# Patient Record
Sex: FEMALE | Race: WHITE | NOT HISPANIC OR LATINO | URBAN - METROPOLITAN AREA
[De-identification: names, ages, dates, MRNs, and addresses within clinical notes are randomized per-mention and may not be internally consistent; named-entity substitution may affect disease eponyms.]

---

## 2017-01-10 ENCOUNTER — COMMUNICATION - HEALTHEAST (OUTPATIENT)
Dept: SCHEDULING | Facility: CLINIC | Age: 57
End: 2017-01-10

## 2017-01-17 ENCOUNTER — COMMUNICATION - HEALTHEAST (OUTPATIENT)
Dept: FAMILY MEDICINE | Facility: CLINIC | Age: 57
End: 2017-01-17

## 2017-01-24 ENCOUNTER — COMMUNICATION - HEALTHEAST (OUTPATIENT)
Dept: FAMILY MEDICINE | Facility: CLINIC | Age: 57
End: 2017-01-24

## 2017-02-07 ENCOUNTER — AMBULATORY - HEALTHEAST (OUTPATIENT)
Dept: PHYSICAL MEDICINE AND REHAB | Facility: CLINIC | Age: 57
End: 2017-02-07

## 2017-02-07 ENCOUNTER — COMMUNICATION - HEALTHEAST (OUTPATIENT)
Dept: PHYSICAL MEDICINE AND REHAB | Facility: CLINIC | Age: 57
End: 2017-02-07

## 2017-02-07 DIAGNOSIS — M54.16 RIGHT LUMBAR RADICULITIS: ICD-10-CM

## 2017-02-07 DIAGNOSIS — M48.061 LUMBAR SPINAL STENOSIS: ICD-10-CM

## 2017-02-07 DIAGNOSIS — M51.16 LUMBAR DISC HERNIATION WITH RADICULOPATHY: ICD-10-CM

## 2017-02-09 ENCOUNTER — AMBULATORY - HEALTHEAST (OUTPATIENT)
Dept: NEUROSURGERY | Facility: CLINIC | Age: 57
End: 2017-02-09

## 2017-02-09 DIAGNOSIS — M54.9 BACK PAIN: ICD-10-CM

## 2017-02-13 ENCOUNTER — COMMUNICATION - HEALTHEAST (OUTPATIENT)
Dept: FAMILY MEDICINE | Facility: CLINIC | Age: 57
End: 2017-02-13

## 2017-02-15 ENCOUNTER — AMBULATORY - HEALTHEAST (OUTPATIENT)
Dept: NEUROSURGERY | Facility: CLINIC | Age: 57
End: 2017-02-15

## 2017-02-15 ENCOUNTER — COMMUNICATION - HEALTHEAST (OUTPATIENT)
Dept: SURGERY | Facility: CLINIC | Age: 57
End: 2017-02-15

## 2017-02-15 ENCOUNTER — OFFICE VISIT - HEALTHEAST (OUTPATIENT)
Dept: NEUROSURGERY | Facility: CLINIC | Age: 57
End: 2017-02-15

## 2017-02-15 ENCOUNTER — HOSPITAL ENCOUNTER (OUTPATIENT)
Dept: RADIOLOGY | Facility: HOSPITAL | Age: 57
Discharge: HOME | End: 2017-02-15
Attending: NEUROLOGICAL SURGERY

## 2017-02-15 DIAGNOSIS — E66.01 MORBID OBESITY (H): ICD-10-CM

## 2017-02-15 DIAGNOSIS — M54.41 CHRONIC BILATERAL LOW BACK PAIN WITH RIGHT-SIDED SCIATICA: ICD-10-CM

## 2017-02-15 DIAGNOSIS — G89.29 CHRONIC BILATERAL LOW BACK PAIN WITH RIGHT-SIDED SCIATICA: ICD-10-CM

## 2017-02-15 DIAGNOSIS — F32.A DEPRESSION: ICD-10-CM

## 2017-02-15 DIAGNOSIS — M48.062 LUMBAR STENOSIS WITH NEUROGENIC CLAUDICATION: ICD-10-CM

## 2017-02-15 DIAGNOSIS — M54.9 BACK PAIN: ICD-10-CM

## 2017-02-15 DIAGNOSIS — M51.26 DISPLACEMENT OF LUMBAR INTERVERTEBRAL DISC WITHOUT MYELOPATHY: ICD-10-CM

## 2017-02-15 DIAGNOSIS — F17.200 TOBACCO USE DISORDER: ICD-10-CM

## 2017-02-15 ASSESSMENT — MIFFLIN-ST. JEOR: SCORE: 1618.22

## 2017-02-17 ENCOUNTER — APPOINTMENT (OUTPATIENT)
Dept: CT IMAGING | Facility: CLINIC | Age: 57
End: 2017-02-17
Attending: STUDENT IN AN ORGANIZED HEALTH CARE EDUCATION/TRAINING PROGRAM
Payer: COMMERCIAL

## 2017-02-17 ENCOUNTER — RECORDS - HEALTHEAST (OUTPATIENT)
Dept: ADMINISTRATIVE | Facility: OTHER | Age: 57
End: 2017-02-17

## 2017-02-17 ENCOUNTER — OFFICE VISIT - HEALTHEAST (OUTPATIENT)
Dept: FAMILY MEDICINE | Facility: CLINIC | Age: 57
End: 2017-02-17

## 2017-02-17 ENCOUNTER — HOSPITAL ENCOUNTER (EMERGENCY)
Facility: CLINIC | Age: 57
Discharge: HOME | End: 2017-02-17
Attending: STUDENT IN AN ORGANIZED HEALTH CARE EDUCATION/TRAINING PROGRAM | Admitting: STUDENT IN AN ORGANIZED HEALTH CARE EDUCATION/TRAINING PROGRAM
Payer: COMMERCIAL

## 2017-02-17 VITALS — DIASTOLIC BLOOD PRESSURE: 77 MMHG | OXYGEN SATURATION: 95 % | TEMPERATURE: 98.2 F | SYSTOLIC BLOOD PRESSURE: 170 MMHG

## 2017-02-17 DIAGNOSIS — E55.9 VITAMIN D DEFICIENCY: ICD-10-CM

## 2017-02-17 DIAGNOSIS — F17.200 TOBACCO USE DISORDER: ICD-10-CM

## 2017-02-17 DIAGNOSIS — M17.12 LEFT KNEE DJD: ICD-10-CM

## 2017-02-17 DIAGNOSIS — M54.41 CHRONIC BILATERAL LOW BACK PAIN WITH RIGHT-SIDED SCIATICA: ICD-10-CM

## 2017-02-17 DIAGNOSIS — G89.29 CHRONIC BILATERAL LOW BACK PAIN WITH RIGHT-SIDED SCIATICA: ICD-10-CM

## 2017-02-17 DIAGNOSIS — S16.1XXA STRAIN OF NECK MUSCLE, INITIAL ENCOUNTER: ICD-10-CM

## 2017-02-17 DIAGNOSIS — E78.5 HYPERLIPIDEMIA: ICD-10-CM

## 2017-02-17 LAB
CHOLEST SERPL-MCNC: 249 MG/DL
FASTING STATUS PATIENT QL REPORTED: YES
HDLC SERPL-MCNC: 41 MG/DL
LDLC SERPL CALC-MCNC: 169 MG/DL
RADIOLOGIST FLAGS: ABNORMAL
TRIGL SERPL-MCNC: 193 MG/DL

## 2017-02-17 PROCEDURE — 25500064 ZZH RX 255 OP 636: Performed by: STUDENT IN AN ORGANIZED HEALTH CARE EDUCATION/TRAINING PROGRAM

## 2017-02-17 PROCEDURE — 25000128 H RX IP 250 OP 636: Performed by: STUDENT IN AN ORGANIZED HEALTH CARE EDUCATION/TRAINING PROGRAM

## 2017-02-17 PROCEDURE — 70498 CT ANGIOGRAPHY NECK: CPT

## 2017-02-17 PROCEDURE — 99284 EMERGENCY DEPT VISIT MOD MDM: CPT | Mod: 25

## 2017-02-17 PROCEDURE — 96360 HYDRATION IV INFUSION INIT: CPT

## 2017-02-17 PROCEDURE — 25000125 ZZHC RX 250: Performed by: STUDENT IN AN ORGANIZED HEALTH CARE EDUCATION/TRAINING PROGRAM

## 2017-02-17 PROCEDURE — 99284 EMERGENCY DEPT VISIT MOD MDM: CPT | Performed by: STUDENT IN AN ORGANIZED HEALTH CARE EDUCATION/TRAINING PROGRAM

## 2017-02-17 PROCEDURE — 72125 CT NECK SPINE W/O DYE: CPT

## 2017-02-17 RX ORDER — IOPAMIDOL 755 MG/ML
70 INJECTION, SOLUTION INTRAVASCULAR ONCE
Status: COMPLETED | OUTPATIENT
Start: 2017-02-17 | End: 2017-02-17

## 2017-02-17 RX ORDER — OXYCODONE AND ACETAMINOPHEN 5; 325 MG/1; MG/1
1 TABLET ORAL EVERY 8 HOURS PRN
COMMUNITY

## 2017-02-17 RX ORDER — CLOBETASOL PROPIONATE 0.5 MG/G
OINTMENT TOPICAL 2 TIMES DAILY
COMMUNITY

## 2017-02-17 RX ADMIN — IOPAMIDOL 70 ML: 755 INJECTION, SOLUTION INTRAVENOUS at 15:32

## 2017-02-17 RX ADMIN — SODIUM CHLORIDE 1000 ML: 9 INJECTION, SOLUTION INTRAVENOUS at 15:50

## 2017-02-17 RX ADMIN — SODIUM CHLORIDE 100 ML: 9 INJECTION, SOLUTION INTRAVENOUS at 15:32

## 2017-02-17 ASSESSMENT — MIFFLIN-ST. JEOR: SCORE: 1638.63
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
## 2017-02-17 NOTE — ED AVS SNAPSHOT
[facility] Emergency Department    [address] [address]    Phone:  [phone]    Fax:  [phone]                                       Celia Love   MRN: [id number]    Department:  [facility] Emergency Department   Date of Visit:  2/17/2017           After Visit Summary Signature Page     I have received my discharge instructions, and my questions have been answered. I have discussed any challenges I see with this plan with the nurse or doctor.    ..........................................................................................................................................  Patient/Patient Representative Signature      ..........................................................................................................................................  Patient Representative Print Name and Relationship to Patient    ..................................................               ................................................  Date                                            Time    ..........................................................................................................................................  Reviewed by Signature/Title    ...................................................              ..............................................  Date                                                            Time
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
## 2017-02-20 ENCOUNTER — COMMUNICATION - HEALTHEAST (OUTPATIENT)
Dept: FAMILY MEDICINE | Facility: CLINIC | Age: 57
End: 2017-02-20

## 2017-02-21 ENCOUNTER — OFFICE VISIT - HEALTHEAST (OUTPATIENT)
Dept: FAMILY MEDICINE | Facility: CLINIC | Age: 57
End: 2017-02-21

## 2017-02-21 DIAGNOSIS — M54.2 NECK PAIN: ICD-10-CM

## 2017-02-21 DIAGNOSIS — M54.50 LOW BACK PAIN: ICD-10-CM
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
## 2017-02-21 ASSESSMENT — MIFFLIN-ST. JEOR: SCORE: 1638.63

## 2017-02-22 ENCOUNTER — COMMUNICATION - HEALTHEAST (OUTPATIENT)
Dept: FAMILY MEDICINE | Facility: CLINIC | Age: 57
End: 2017-02-22

## 2017-02-22 DIAGNOSIS — K11.9 LESION OF PAROTID GLAND: ICD-10-CM

## 2017-03-02 ENCOUNTER — COMMUNICATION - HEALTHEAST (OUTPATIENT)
Dept: FAMILY MEDICINE | Facility: CLINIC | Age: 57
End: 2017-03-02

## 2017-03-10 ENCOUNTER — COMMUNICATION - HEALTHEAST (OUTPATIENT)
Dept: FAMILY MEDICINE | Facility: CLINIC | Age: 57
End: 2017-03-10

## 2017-03-16 ENCOUNTER — COMMUNICATION - HEALTHEAST (OUTPATIENT)
Dept: FAMILY MEDICINE | Facility: CLINIC | Age: 57
End: 2017-03-16

## 2017-03-16 DIAGNOSIS — M54.50 LOW BACK PAIN: ICD-10-CM

## 2017-03-21 ENCOUNTER — COMMUNICATION - HEALTHEAST (OUTPATIENT)
Dept: SCHEDULING | Facility: CLINIC | Age: 57
End: 2017-03-21

## 2017-04-12 ENCOUNTER — OFFICE VISIT - HEALTHEAST (OUTPATIENT)
Dept: SURGERY | Facility: CLINIC | Age: 57
End: 2017-04-12

## 2017-04-12 ENCOUNTER — OFFICE VISIT - HEALTHEAST (OUTPATIENT)
Dept: OTOLARYNGOLOGY | Facility: CLINIC | Age: 57
End: 2017-04-12

## 2017-04-12 DIAGNOSIS — E55.9 VITAMIN D DEFICIENCY: ICD-10-CM

## 2017-04-12 DIAGNOSIS — E66.01 MORBID OBESITY, UNSPECIFIED OBESITY TYPE (H): ICD-10-CM

## 2017-04-12 DIAGNOSIS — M54.50 LOW BACK PAIN: ICD-10-CM

## 2017-04-12 DIAGNOSIS — K11.8 MASS OF PAROTID GLAND: ICD-10-CM

## 2017-04-12 DIAGNOSIS — E88.819 INSULIN RESISTANCE: ICD-10-CM

## 2017-04-12 ASSESSMENT — MIFFLIN-ST. JEOR
SCORE: 1615.05
SCORE: 1612.55

## 2017-04-13 ENCOUNTER — AMBULATORY - HEALTHEAST (OUTPATIENT)
Dept: LAB | Facility: HOSPITAL | Age: 57
End: 2017-04-13

## 2017-04-13 ENCOUNTER — HOSPITAL ENCOUNTER (OUTPATIENT)
Dept: ULTRASOUND IMAGING | Facility: HOSPITAL | Age: 57
Discharge: HOME | End: 2017-04-13
Attending: OTOLARYNGOLOGY

## 2017-04-13 ENCOUNTER — COMMUNICATION - HEALTHEAST (OUTPATIENT)
Dept: FAMILY MEDICINE | Facility: CLINIC | Age: 57
End: 2017-04-13

## 2017-04-13 DIAGNOSIS — K11.8 MASS OF PAROTID GLAND: ICD-10-CM

## 2017-04-13 DIAGNOSIS — E66.01 MORBID OBESITY, UNSPECIFIED OBESITY TYPE (H): ICD-10-CM

## 2017-04-13 DIAGNOSIS — R22.0 LOCALIZED SWELLING, MASS AND LUMP, HEAD: ICD-10-CM

## 2017-04-14 LAB
HBA1C MFR BLD: 6.9 % (ref 4.2–6.1)
LAB AP CHARGES (HE HISTORICAL CONVERSION): ABNORMAL
LAB AP INITIAL CYTO EVAL (HE HISTORICAL CONVERSION): ABNORMAL
LAB MED GENERAL PATH INTERP (HE HISTORICAL CONVERSION): ABNORMAL
PATH REPORT.COMMENTS IMP SPEC: ABNORMAL
PATH REPORT.FINAL DX SPEC: ABNORMAL
PATH REPORT.MICROSCOPIC SPEC OTHER STN: ABNORMAL
PATH REPORT.RELEVANT HX SPEC: ABNORMAL
SPECIMEN DESCRIPTION: ABNORMAL

## 2017-04-17 ENCOUNTER — COMMUNICATION - HEALTHEAST (OUTPATIENT)
Dept: SCHEDULING | Facility: CLINIC | Age: 57
End: 2017-04-17

## 2017-04-18 ENCOUNTER — COMMUNICATION - HEALTHEAST (OUTPATIENT)
Dept: SURGERY | Facility: CLINIC | Age: 57
End: 2017-04-18

## 2017-04-19 ENCOUNTER — COMMUNICATION - HEALTHEAST (OUTPATIENT)
Dept: ADMINISTRATIVE | Facility: CLINIC | Age: 57
End: 2017-04-19

## 2017-04-28 ENCOUNTER — OFFICE VISIT - HEALTHEAST (OUTPATIENT)
Dept: SURGERY | Facility: CLINIC | Age: 57
End: 2017-04-28

## 2017-04-28 ENCOUNTER — OFFICE VISIT - HEALTHEAST (OUTPATIENT)
Dept: FAMILY MEDICINE | Facility: CLINIC | Age: 57
End: 2017-04-28

## 2017-04-28 DIAGNOSIS — E66.9 OBESITY (BMI 30-39.9): ICD-10-CM

## 2017-04-28 DIAGNOSIS — Z71.3 DIETARY COUNSELING: ICD-10-CM

## 2017-04-28 DIAGNOSIS — M48.062 LUMBAR STENOSIS WITH NEUROGENIC CLAUDICATION: ICD-10-CM

## 2017-04-28 DIAGNOSIS — R07.9 CHEST PAIN: ICD-10-CM

## 2017-04-28 DIAGNOSIS — F41.9 ANXIETY: ICD-10-CM

## 2017-04-28 ASSESSMENT — MIFFLIN-ST. JEOR
SCORE: 1562.65
SCORE: 1566.28

## 2017-05-01 ENCOUNTER — COMMUNICATION - HEALTHEAST (OUTPATIENT)
Dept: FAMILY MEDICINE | Facility: CLINIC | Age: 57
End: 2017-05-01

## 2017-05-04 ENCOUNTER — COMMUNICATION - HEALTHEAST (OUTPATIENT)
Dept: FAMILY MEDICINE | Facility: CLINIC | Age: 57
End: 2017-05-04

## 2017-05-16 ENCOUNTER — COMMUNICATION - HEALTHEAST (OUTPATIENT)
Dept: FAMILY MEDICINE | Facility: CLINIC | Age: 57
End: 2017-05-16

## 2017-06-05 ENCOUNTER — COMMUNICATION - HEALTHEAST (OUTPATIENT)
Dept: FAMILY MEDICINE | Facility: CLINIC | Age: 57
End: 2017-06-05

## 2017-06-09 ENCOUNTER — COMMUNICATION - HEALTHEAST (OUTPATIENT)
Dept: FAMILY MEDICINE | Facility: CLINIC | Age: 57
End: 2017-06-09

## 2017-06-27 ENCOUNTER — RECORDS - HEALTHEAST (OUTPATIENT)
Dept: ADMINISTRATIVE | Facility: OTHER | Age: 57
End: 2017-06-27

## 2017-07-07 ENCOUNTER — COMMUNICATION - HEALTHEAST (OUTPATIENT)
Dept: FAMILY MEDICINE | Facility: CLINIC | Age: 57
End: 2017-07-07

## 2017-07-07 DIAGNOSIS — M54.50 LOW BACK PAIN: ICD-10-CM

## 2017-07-25 ENCOUNTER — OFFICE VISIT - HEALTHEAST (OUTPATIENT)
Dept: FAMILY MEDICINE | Facility: CLINIC | Age: 57
End: 2017-07-25

## 2017-07-25 DIAGNOSIS — M54.2 NECK PAIN: ICD-10-CM

## 2017-07-25 DIAGNOSIS — Z12.31 SCREENING MAMMOGRAM FOR HIGH-RISK PATIENT: ICD-10-CM

## 2017-07-25 DIAGNOSIS — R51.9 HEADACHE: ICD-10-CM

## 2017-07-25 DIAGNOSIS — M54.41 CHRONIC BILATERAL LOW BACK PAIN WITH RIGHT-SIDED SCIATICA: ICD-10-CM

## 2017-07-25 DIAGNOSIS — M25.561 RIGHT KNEE PAIN: ICD-10-CM

## 2017-07-25 DIAGNOSIS — Z12.39 BREAST CANCER SCREENING: ICD-10-CM

## 2017-07-25 DIAGNOSIS — G89.29 CHRONIC BILATERAL LOW BACK PAIN WITH RIGHT-SIDED SCIATICA: ICD-10-CM

## 2017-07-25 DIAGNOSIS — M25.562 LEFT KNEE PAIN: ICD-10-CM

## 2017-07-25 ASSESSMENT — MIFFLIN-ST. JEOR: SCORE: 1593.5

## 2017-07-26 ENCOUNTER — OFFICE VISIT - HEALTHEAST (OUTPATIENT)
Dept: SURGERY | Facility: CLINIC | Age: 57
End: 2017-07-26

## 2017-07-26 DIAGNOSIS — E88.819 INSULIN RESISTANCE: ICD-10-CM

## 2017-07-26 DIAGNOSIS — E11.9 DIABETES (H): ICD-10-CM

## 2017-07-26 DIAGNOSIS — E66.01 MORBID OBESITY, UNSPECIFIED OBESITY TYPE (H): ICD-10-CM

## 2017-07-26 ASSESSMENT — MIFFLIN-ST. JEOR: SCORE: 1589.87

## 2017-08-10 ENCOUNTER — COMMUNICATION - HEALTHEAST (OUTPATIENT)
Dept: FAMILY MEDICINE | Facility: CLINIC | Age: 57
End: 2017-08-10

## 2017-08-15 ENCOUNTER — RECORDS - HEALTHEAST (OUTPATIENT)
Dept: ADMINISTRATIVE | Facility: OTHER | Age: 57
End: 2017-08-15

## 2017-09-07 ENCOUNTER — RECORDS - HEALTHEAST (OUTPATIENT)
Dept: ADMINISTRATIVE | Facility: OTHER | Age: 57
End: 2017-09-07

## 2017-09-07 ENCOUNTER — COMMUNICATION - HEALTHEAST (OUTPATIENT)
Dept: FAMILY MEDICINE | Facility: CLINIC | Age: 57
End: 2017-09-07

## 2017-10-03 ENCOUNTER — COMMUNICATION - HEALTHEAST (OUTPATIENT)
Dept: FAMILY MEDICINE | Facility: CLINIC | Age: 57
End: 2017-10-03

## 2017-10-03 ENCOUNTER — RECORDS - HEALTHEAST (OUTPATIENT)
Dept: ADMINISTRATIVE | Facility: OTHER | Age: 57
End: 2017-10-03

## 2017-10-09 ENCOUNTER — COMMUNICATION - HEALTHEAST (OUTPATIENT)
Dept: FAMILY MEDICINE | Facility: CLINIC | Age: 57
End: 2017-10-09

## 2017-10-09 ENCOUNTER — OFFICE VISIT - HEALTHEAST (OUTPATIENT)
Dept: FAMILY MEDICINE | Facility: CLINIC | Age: 57
End: 2017-10-09

## 2017-10-09 DIAGNOSIS — E11.9 TYPE 2 DIABETES MELLITUS (H): ICD-10-CM

## 2017-10-09 DIAGNOSIS — M17.12 OSTEOARTHRITIS OF LEFT KNEE: ICD-10-CM

## 2017-10-09 DIAGNOSIS — I25.10 CORONARY ARTERY DISEASE: ICD-10-CM

## 2017-10-09 DIAGNOSIS — E78.5 HYPERLIPIDEMIA: ICD-10-CM

## 2017-10-09 DIAGNOSIS — M54.41 CHRONIC BILATERAL LOW BACK PAIN WITH RIGHT-SIDED SCIATICA: ICD-10-CM

## 2017-10-09 DIAGNOSIS — G89.29 CHRONIC BILATERAL LOW BACK PAIN WITH RIGHT-SIDED SCIATICA: ICD-10-CM

## 2017-10-09 DIAGNOSIS — F17.200 TOBACCO USE DISORDER: ICD-10-CM

## 2017-10-11 ENCOUNTER — RECORDS - HEALTHEAST (OUTPATIENT)
Dept: ADMINISTRATIVE | Facility: OTHER | Age: 57
End: 2017-10-11

## 2017-10-30 ENCOUNTER — COMMUNICATION - HEALTHEAST (OUTPATIENT)
Dept: FAMILY MEDICINE | Facility: CLINIC | Age: 57
End: 2017-10-30

## 2018-03-30 ENCOUNTER — RECORDS - HEALTHEAST (OUTPATIENT)
Dept: ADMINISTRATIVE | Facility: OTHER | Age: 58
End: 2018-03-30

## 2018-06-24 ENCOUNTER — COMMUNICATION - HEALTHEAST (OUTPATIENT)
Dept: FAMILY MEDICINE | Facility: CLINIC | Age: 58
End: 2018-06-24

## 2019-04-01 ENCOUNTER — COMMUNICATION - HEALTHEAST (OUTPATIENT)
Dept: FAMILY MEDICINE | Facility: CLINIC | Age: 59
End: 2019-04-01

## 2019-04-01 DIAGNOSIS — G89.29 CHRONIC PAIN OF RIGHT KNEE: ICD-10-CM

## 2019-04-01 DIAGNOSIS — M25.561 CHRONIC PAIN OF RIGHT KNEE: ICD-10-CM

## 2019-04-04 ENCOUNTER — RECORDS - HEALTHEAST (OUTPATIENT)
Dept: ADMINISTRATIVE | Facility: OTHER | Age: 59
End: 2019-04-04

## 2021-05-26 ENCOUNTER — RECORDS - HEALTHEAST (OUTPATIENT)
Dept: ADMINISTRATIVE | Facility: CLINIC | Age: 61
End: 2021-05-26

## 2021-05-27 ENCOUNTER — RECORDS - HEALTHEAST (OUTPATIENT)
Dept: ADMINISTRATIVE | Facility: CLINIC | Age: 61
End: 2021-05-27

## 2021-05-27 NOTE — TELEPHONE ENCOUNTER
No PCP listed in chart. Pt last seen by Dr Barnett 10/9/2017. Please forward to covering provider as appropriate. Thank you.         RN cannot approve Refill Request    RN can NOT refill this medication med is not covered by policy/route to provider     . Last office visit: 10/9/2017 Adam Barnett MD Last Physical: Visit date not found Last MTM visit: Visit date not found Last visit same specialty: 10/9/2017 Adam Barnett MD.  Next visit within 3 mo: Visit date not found  Next physical within 3 mo: Visit date not found      [facility], [facility] Triage/Med Refill 4/2/2019    Requested Prescriptions   Pending Prescriptions Disp Refills     amoxicillin (AMOXIL) 500 MG capsule [Pharmacy Med Name: AMOXICILLIN 500MG CAPSULES] 4 capsule 0     Sig: TAKE 4 CAPSULES(2000 MG) BY MOUTH BEFORE DENTAL APPOINTMENT AS NEEDED    There is no refill protocol information for this order

## 2021-05-28 ENCOUNTER — RECORDS - HEALTHEAST (OUTPATIENT)
Dept: ADMINISTRATIVE | Facility: CLINIC | Age: 61
End: 2021-05-28

## 2021-05-30 ENCOUNTER — RECORDS - HEALTHEAST (OUTPATIENT)
Dept: ADMINISTRATIVE | Facility: CLINIC | Age: 61
End: 2021-05-30

## 2021-05-30 VITALS — HEIGHT: 63 IN | WEIGHT: 236.9 LBS | BODY MASS INDEX: 41.98 KG/M2

## 2021-05-30 VITALS — WEIGHT: 225.8 LBS | HEIGHT: 64 IN | BODY MASS INDEX: 38.55 KG/M2

## 2021-05-30 VITALS — BODY MASS INDEX: 40.2 KG/M2 | HEIGHT: 64 IN | WEIGHT: 235.5 LBS

## 2021-05-30 VITALS — WEIGHT: 236 LBS | BODY MASS INDEX: 40.29 KG/M2 | HEIGHT: 64 IN

## 2021-05-30 VITALS — HEIGHT: 64 IN | BODY MASS INDEX: 40.97 KG/M2 | WEIGHT: 240 LBS

## 2021-05-30 VITALS — BODY MASS INDEX: 38.41 KG/M2 | HEIGHT: 64 IN | WEIGHT: 225 LBS

## 2021-05-30 VITALS — WEIGHT: 240 LBS | BODY MASS INDEX: 40.97 KG/M2 | HEIGHT: 64 IN

## 2021-05-31 VITALS — HEIGHT: 64 IN | BODY MASS INDEX: 40.28 KG/M2

## 2021-05-31 VITALS — WEIGHT: 231 LBS | HEIGHT: 64 IN | BODY MASS INDEX: 39.44 KG/M2

## 2021-05-31 VITALS — BODY MASS INDEX: 39.57 KG/M2 | HEIGHT: 64 IN | WEIGHT: 231.8 LBS

## 2021-06-02 ENCOUNTER — RECORDS - HEALTHEAST (OUTPATIENT)
Dept: ADMINISTRATIVE | Facility: CLINIC | Age: 61
End: 2021-06-02

## 2021-06-05 ENCOUNTER — RECORDS - HEALTHEAST (OUTPATIENT)
Dept: UROLOGY | Facility: CLINIC | Age: 61
End: 2021-06-05

## 2021-06-05 ENCOUNTER — RECORDS - HEALTHEAST (OUTPATIENT)
Dept: FAMILY MEDICINE | Age: 61
End: 2021-06-05

## 2021-06-05 DIAGNOSIS — N20.0 CALCULUS OF KIDNEY: ICD-10-CM

## 2021-06-05 DIAGNOSIS — M54.9 BACKACHE: ICD-10-CM

## 2021-06-05 DIAGNOSIS — R10.9 ABDOMINAL PAIN: ICD-10-CM

## 2021-06-08 NOTE — PROGRESS NOTES
A consult was placed to Dr. Dawkins.  The reason for the consult was back pain.   The following XR were ordered to assess for alignment: .AP/Lat and Flex/Ext.  Nida Nguyễn RN, CNRN
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
## 2021-06-08 NOTE — PROGRESS NOTES
"Assessment/Plan:        Diagnoses and all orders for this visit:    Bulging Disc (L5 - S1)    Lumbar stenosis with neurogenic claudication  -     Ambulatory referral to Physical Therapy    Chronic bilateral low back pain with right-sided sciatica  -     Ambulatory referral to Physical Therapy    Nicotine Dependence    Morbid obesity  -     Amb Referral to Bariatric Dietician    Osteoarthritis Of The Knee    Depression  -     Ambulatory referral to Psychology          It was a pleasure to evaluate Celia Love at the kind referral of Shanita Arnold CNP for low back and right leg pain.    Celia has lumbar stenosis with neurogenic claudication from stenosis at L5-S1, and bilateral S1 radiculopathy right worse than left from lateral recess stenosis at L5-S1.   She is morbidly obese and is currently smoking cigarettes, and she currently is under a major amount of psychological stress per her report because of her daughter's poorly controlled bipolar disorder and she is facing moving to [facility] to stay with her brother's family to try to \"get her life in order\" as she says.    I do think that she is a candidate for a decompressive L1-S1 bilateral hemilaminectomy, medial facetectomy, and diskectomy, but this will not help her back pain (multifactorial due to facet arthropathy, obesity, spondylolisthesis at L3-4), only would be expected to help her leg pain, and if she doesn't lose weight and stop smoking, the disc herniation will recur and result will be short-lived. I explained this to her.    Celia's morbid obesity (her BMI is >42) puts her at elevated risk of surgical and medical postoperative complications including elevated risk of bleeding, longer operative time, more positional injury risk, longer hospital stay, higher risk of infection.    Her cigarette smoking also puts her at elevated risk of surgical and medical postoperative complications. For her long term spine health and overall health, I " explained that she absolutely must stop smoking and engage in weight loss.    I would like to get her to see Bariatric Dietician as well as Behavioral Psychotherapist [facility] to give her positive tools to deal with her stress and form positive coping habits and a plan moving forward for weight loss and smoking cessation, prior to introducing the stress of surgical intervention.    Aquatherapy and recumbent biking are tolerated well by patients with lumbar stenosis, and I have ordered these to increase her activity level and help her burn some calories and get some exercise prior to thinking about low back surgery.    She will talk with her PCP this week about rechecking her vitamin D level (low levels associated with depression and worse low back pain) as well as stopping smoking as she has tried Wellbutrin and the nicotine patch in the past without success.    I will see her back in 2-3 weeks to check on her progress.      I spent 70 minutes in patient care with greater than 50% spent in counseling and/or coordination of care.      Subjective:    Patient ID: Celia Love is a 56 y.o. female.    HPI   Celia presents for a second opinion regarding L5-S1 disc herniation.  She previously saw Dr. Lisandro Brooks at [facility]- I obtained and reviewed his records, which indicate that because her leg pain improved with injection, he recommended conservative management for her low back pain.    Celia has low back pain radiating to her posterior bilateral legs, right greater than left, to soles of feet bilaerally  Exacerbated with standing and walking  Relieved by: right L5-S1, S1-S2 TFESI 12/6/16 minorly helped right leg pain for short time per patient's report      She feels some imbalance with walking, no hand clumsiness, no hand weakness. NO bladder urgency or changes.    She is a cigarette smoker.  She is morbidly obese with a BMI >42 today.    Body mass index is 42.91 kg/(m^2).  LABS:    VITAMIN  D, TOTAL (25-HYDROXY)   Date Value Ref Range Status   02/26/2014 11.2 (L) 30.0 - 80.0 ng/mL Final     Comment:     Deficiency              <10.0 ng/mL               Insufficiency       10.0-29.9 ng/mL               Sufficiency         30.0-80.0 ng/mL               Toxicity (possible)    >100.0 ng/mL       IMAGING per my interpretation:  MRI lumbar spine 11/7/16 with large diffuse bilateral disc bulge L5-S1 with bilateral lateral recess stenosis    Flex-ext lumbar xrays 2/15/17: grade 1 L3-4 spondylolisthesis with less than 3mm movement flex-ext; pelvic incidence 51 lumbar lordosis 42        Review of Systems   Constitutional: Negative for activity change, appetite change, chills, fatigue, fever and unexpected weight change.   HENT: Negative for dental problem, mouth sores and trouble swallowing.    Eyes: Negative for visual disturbance.   Respiratory: Negative for shortness of breath.    Cardiovascular: Negative for leg swelling.   Gastrointestinal: Negative for abdominal pain, constipation, diarrhea, nausea and vomiting.   Endocrine: Negative for cold intolerance.   Genitourinary: Negative for difficulty urinating, dysuria, enuresis, frequency and urgency.   Musculoskeletal: Positive for arthralgias, back pain, gait problem and myalgias. Negative for neck pain and neck stiffness.   Skin: Negative for color change.   Allergic/Immunologic: Negative for immunocompromised state.   Neurological: Positive for weakness and numbness. Negative for tremors, speech difficulty and headaches.   Hematological: Does not bruise/bleed easily.   Psychiatric/Behavioral: The patient is not nervous/anxious.      Past Medical History:   Diagnosis Date     Bulging disc      DDD (degenerative disc disease)      Diverticular disease      Dysuria      Headache      Hyperlipemia      Insomnia      Kidney stone     20 years ago     LBP (low back pain)      Lumbar canal stenosis      Nicotine dependence      Obesity      Osteoarthritis      knee     PONV (postoperative nausea and vomiting)      Postmenopausal bleeding     D & C       Psoriasis      Umbilical hernia      Vitamin D deficiency      Past Surgical History:   Procedure Laterality Date     BREAST BIOPSY Left 30 yrs ago     BREAST CYST EXCISION Left 30 yrs ago    benign tumor     CYSTOSCOPY  9/5/14     DNC  2013     OOPHORECTOMY Right 35 yrs ago     [facility] ARTHROPLASTY TIBIAL PLATEAU      Description: Knee Replacement;  Recorded: 09/03/2014;     REPLACEMENT TOTAL KNEE Right      Social History     Social History     Marital status: Single     Spouse name: N/A     Number of children: N/A     Years of education: N/A     Occupational History     Not on file.     Social History Main Topics     Smoking status: Current Every Day Smoker     Packs/day: 0.50     Years: 30.00     Smokeless tobacco: Never Used     Alcohol use Yes      Comment: occ.     Drug use: No     Sexual activity: Not on file     Other Topics Concern     Not on file     Social History Narrative     Family History   Problem Relation Age of Onset     Urolithiasis Mother      single at older age     Heart disease Mother      Hypertension Mother      Lung disease Maternal Grandfather      emphazema     Diabetes Paternal Grandmother      not sure             Objective:    Physical Exam   Constitutional: She is oriented to person, place, and time. She appears well-developed and well-nourished. She is cooperative. No distress.   HENT:   Head: Normocephalic and atraumatic.   Eyes: Conjunctivae are normal.   Neck: Normal range of motion. Neck supple. No spinous process tenderness and no muscular tenderness present. No tracheal deviation present.   Cardiovascular: Normal rate and regular rhythm.    Pulmonary/Chest: Effort normal and breath sounds normal.   Abdominal: Soft. Bowel sounds are normal. She exhibits no distension. There is no tenderness.   Musculoskeletal:   Cervical flexion/extension ROM: normal  Lumbar flexion/extension ROM: flexion  to 90, extension to 5 degrees   Neurological: She is alert and oriented to person, place, and time. A sensory deficit is present. No cranial nerve deficit. She displays a negative Romberg sign. Gait normal. She displays no Babinski's sign on the right side. She displays no Babinski's sign on the left side.   Reflex Scores:       Tricep reflexes are 2+ on the right side and 2+ on the left side.       Bicep reflexes are 2+ on the right side and 2+ on the left side.       Brachioradialis reflexes are 2+ on the right side and 2+ on the left side.       Patellar reflexes are 0 on the right side and 0 on the left side.       Achilles reflexes are 0 on the right side and 0 on the left side.  Decreased sensation right plantar foot and left lateral foot    Strength:                                                LEFT      RIGHT  Deltoid                                     5            5  Bicep                                       5            5  Wrist Extensor                        5            5   Tricep                                      5            5  Finger Flexion                         5             5  Finger Abduction                     5            5                                            5           5    Hip Flexion                               5            5   Knee Extension                       5             5  Dorsiflexion                              5             5  Extensor Hallucis Longus        5            5  Plantar Flexion                         5             5    No Lhermitte's, No Spurling's  No Cady's   No ankle clonus  Able to tandem walk with mild difficulty      SI Joint EXAM:                                        LEFT      RIGHT  Tamie Finger Test            -             +  PSIS tenderness             -             +  Thigh Thrust                   -              -  MANDY                            -             +  Pelvic gapping                -              -  Pelvic  compression        -              -  Gaenslen's                      -              +  Sacral Thrust                  -              +    Hip EXAM:  Axial loading/posterior     -              -  Impingement                                Medial femoral   Impingement:                  -              +       Skin: Skin is warm, dry and intact.   Psychiatric: She has a normal mood and affect. Her speech is normal and behavior is normal.
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
## 2021-06-09 NOTE — PROGRESS NOTES
SUBJECTIVE:    This is a 56-year-old female who presents to the clinic and follow-up for multiple medical concerns. Her primary concern has been chronic pain. Essentially, she has had chronic low back pain as well as significant osteoarthritis of both knees. She continues to have significant pain in the low back that radiates to her right but buttock, down to her right thigh, and extends all the way to her right ankle. She feels like there is a rope going down her leg. The pain can be quite severe. She has taken gabapentin. At times, she has required Percocet for breakthrough pain. Unfortunately, she also has significant osteoarthritis and likely is a candidate for a total knee replacement. She has had a previous right total knee arthroplasty and has severe arthritis in her left knee. She has been followed by orthopedics and rheumatology and has received cortisone injections. She is a candidate for left knee replacement but is trying to defer that. She has history of low back pain that does radiate to her legs. She has known lumbar canal stenosis and a disc bulge at L5 - S1. A recent MRI showed a stable moderate size diffuse posterior disc bulge at L5 - S1. This results in moderate to severe spinal canal stenosis. This partially effaces the descending bilateral S1 nerve roots. There is moderate left and moderate to severe right neural foraminal stenosis as well. There is mild spinal canal stenosis at L3-L4 and L4-L5. Her pain continues to be debilitating. After her last visit she was referred to spine care. She received previous cortisone injections with minimal relief. She also followed up with neurosurgery. She was assessed to be  a candidate for a decompressive L1-S1 bilateral hemilaminectomy, medial facetectomy, and diskectomy. Also, She was told that surgery would likely help her leg pain but would not alleviate all of her back pain. Weight loss was recommended. Tobacco cessation was recommended. Also,  recommendations were made for checking a vitamin D level.    Finally: Given significant stressors including financial stress, health concerns, and given that her daughter is helping health problems they plan on moving to [place] to stay with her brother where they can receive care and work on their health goals.      Patient Active Problem List   Diagnosis     Morbid obesity     Insomnia     Generalized Pain     Headache     Psoriasis     Hyperlipidemia     Nicotine Dependence     Osteoarthritis Of The Knee     Bulging Disc (L5 - S1)     Vitamin D Deficiency     Calculus of kidney     Controlled substance agreement signed     Low back pain     Lumbar stenosis with neurogenic claudication     Depression       Current Outpatient Prescriptions on File Prior to Visit   Medication Sig     clobetasol (TEMOVATE) 0.05 % ointment      cyclobenzaprine (FLEXERIL) 10 MG tablet TAKE 1 TABLET(10 MG) BY MOUTH THREE TIMES DAILY AS NEEDED FOR MUSCLE SPASMS     OTEZLA 30 mg Tab 2 (two) times a day.     oxyCODONE-acetaminophen (PERCOCET) 5-325 mg per tablet Take 1-2 tablets by mouth every 6 (six) hours as needed for pain.     zolpidem (AMBIEN) 10 mg tablet TAKE 1 TABLET(10 MG) BY MOUTH AT BEDTIME AS NEEDED FOR SLEEP     No current facility-administered medications on file prior to visit.        Allergies   Allergen Reactions     Other Allergy (See Comments) Other (See Comments)     Contrast [employer] [facility], 07/21/2010[place] [employer] [facility] [place]    flushed            A 12 point comprehensive review of systems was negative except as noted.      OBJECTIVE:     Vitals:    02/17/17 0945   BP: 134/82   Pulse: 92   Resp: 16   Temp: 98.3  F (36.8  C)       General: Alert, not acutely distressed   Head:  Atraumatic, normocephalic  Eyes:  PERRL, extraocular movements are intact  Nose:  Septum midline  Neck:  Supple without adenopathy or thyromegaly   Cardiovascular: S1-S2 with regular rate and without murmur, rub, or  gallop   Lungs:  Clear to auscultation bilaterally   Back: She is tender to palpation in the paraspinous muscle tendon area lateral to the lumbar spine  Straight leg raise is negative  Abdomen:  Soft, non tender, nondistended  Extremities: Without cyanosis  Trace of lower extremity edema  Neuro:  CN II-XII appear intact        Laboratory Results:    Results for orders placed or performed in visit on 02/17/17   Basic Metabolic Panel   Result Value Ref Range    Sodium 139 136 - 145 mmol/L    Potassium 4.6 3.5 - 5.0 mmol/L    Chloride 107 98 - 107 mmol/L    CO2 21 (L) 22 - 31 mmol/L    Anion Gap, Calculation 11 5 - 18 mmol/L    Glucose 113 70 - 125 mg/dL    Calcium 9.3 8.5 - 10.5 mg/dL    BUN 8 8 - 22 mg/dL    Creatinine 0.71 0.60 - 1.10 mg/dL    GFR MDRD Af Amer >60 >60 mL/min/1.73m2    GFR MDRD Non Af Amer >60 >60 mL/min/1.73m2   Lipid Cascade   Result Value Ref Range    Cholesterol 249 (H) <=199 mg/dL    Triglycerides 193 (H) <=149 mg/dL    HDL Cholesterol 41 (L) >=50 mg/dL    LDL Calculated 169 (H) <=129 mg/dL    Patient Fasting > 8hrs? Yes    HM2(CBC w/o Differential)   Result Value Ref Range    WBC 8.8 4.0 - 11.0 thou/uL    RBC 4.87 3.80 - 5.40 mill/uL    Hemoglobin 14.2 12.0 - 16.0 g/dL    Hematocrit 41.9 35.0 - 47.0 %    MCV 86 80 - 100 fL    MCH 29.2 27.0 - 34.0 pg    MCHC 34.0 32.0 - 36.0 g/dL    RDW 11.6 11.0 - 14.5 %    Platelets 433 140 - 440 thou/uL    MPV 7.3 7.0 - 10.0 fL   Vitamin D, Total (25-Hydroxy)   Result Value Ref Range    Vitamin D, Total (25-Hydroxy) 16.9 (L) 30.0 - 80.0 ng/mL   Hepatic Profile   Result Value Ref Range    Bilirubin, Total 0.3 0.0 - 1.0 mg/dL    Bilirubin, Direct 0.1 <=0.5 mg/dL    Protein, Total 6.6 6.0 - 8.0 g/dL    Albumin 3.6 3.5 - 5.0 g/dL    Alkaline Phosphatase 73 45 - 120 U/L    AST 15 0 - 40 U/L    ALT 15 0 - 45 U/L         ASSESSMENT AND PLAN:    1. Chronic bilateral low back pain with right-sided sciatica    Reviewed her MRI of the lumbar spine which is significantly  abnormal as described above  Recommend conservative treatment with gabapentin which can be increased  Recommend ibuprofen on a scheduled basis as long as she can tolerate this  Discussed potential side effects and recommend titrating her dose  She has followed up with the spine clinic and received trends for a little epidural steroid injections at L5 - S1 and S1 - S2  Reviewed the note from neurosurgery with recommendations for weight loss and tobacco cessation  Surgical intervention may be considered but would likely only alleviate leg pain and not her back pain  Check vitamin D level  Patient will be given a refill of her Percocet  She has signed a controlled substance agreement  Review potential side effects and discuss this can be habit-forming  Reviewed risks  - HM2(CBC w/o Differential)    2. Nicotine Dependence    Strongly encouraged tobacco cessation  Start Wellbutrin for tobacco cessation    3. Left knee DJD    Follow-up with orthopedics for cortisone injections  Is considering total knee arthoplasty    4. Vitamin D deficiency    Check a vitamin D level  Recommend vitamin D supplementation  - Vitamin D, Total (25-Hydroxy)    5. Hyperlipidemia    Reviewed elevated cholesterol  - Basic Metabolic Panel  - Lipid Cascade  - Hepatic Profile    6. Rash, psoriasis    Sent a prescription for triamcinolone cream and recommend using carefully    25 minutes were spent with the patient and greater than 50% of the time was spent in face to face counseling and coordination of care      Adam Barnett MD      This note has been dictated and transcribed using voice recognition software.  Any grammatical or contextual distortions are unintentional and inherent to the software.
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
## 2021-06-10 NOTE — PROGRESS NOTES
HPI:  Celia Love is a 56 y.o. year old female with weight related co-morbidities of   Patient Active Problem List   Diagnosis     Morbid obesity     Insomnia     Generalized Pain     Headache     Psoriasis     Hyperlipidemia     Nicotine Dependence     Osteoarthritis Of The Knee     Bulging Disc (L5 - S1)     Vitamin D Deficiency     Calculus of kidney     Controlled substance agreement signed     Low back pain     Lumbar stenosis with neurogenic claudication     Depression    who presents for medical bariatric consultation in the setting of weight related co-morbidities.  Her BMI is Body mass index is 41.44 kg/(m^2)..      Assessment: Celia is a 56 y.o. year old female who presents for medical weight management.      Plan:    1. Low back pain  Hopefully this will improve with weightloss    2. Morbid obesity, unspecified obesity type  Discussed dietary changes to assist with weight  Loss. She will follow up with dietician next available. She will keep a food journal and present it to the dietician. She will follow up with me 1 month later. We will consider adding phentermine at that times.    3. Insulin resistance  Pt has signs of insulin resistance on physical exam. I will check an A1C. We discussed how this can interfere with weight loss. I prescribed metformin.      Follow up: next available with dietician, 1 month later with me      >45 minutes spent with patient, > 50% spent in counseling          Counseling:  We discussed [facility] Bariatric Basics including:  -eating 3 meals daily  -eating protein first  -eating slowly, chewing food well  -avoiding/limiting calorie containing beverages  -choosing wheat, not white with breads, crackers, pastas, eddie, bagels, tortillas, rice  -limiting carbohydrates in general  -limiting restaurant or cafeteria eating to twice a week or less    We discussed the importance of restorative sleep and stress management in maintaining a healthy weight.    We reviewed  medications associated with weight gain.    We discussed insulin resistance and glycemic index as it relates to appetite and weight control.     We discussed the National Weight Control Registry healthy weight maintenance strategies and ways to optimize metabolism.  We discussed the importance of physical activity including cardiovascular and strength training in maintaining a healthier weight and explored viable options.    We discussed medications available for weight loss including phentermine, phendimetrazine, topamax, qsymia, lorcaserin, contrave, diethylproprion, and orlistat. We discussed the risks and benefits of each. We discussed indications, contraindications, potential side effects, and estimated costs of each. Literature was offered.    History Surrouding Consultation:  Struggles with weight started at age 29  Her weight at age 17 was unsure  She has had several past supervised and unsupervised weight loss attempts  The most weight lost was: before her wedding she lost 25#, she has lost approximately 20 pounds over the last few years  Unfortunately there was not durable weight maintenance.  History of bulimia, anorexia, or binge eating disorder? no  If Present has eating disorder been in remission at least 3 years? na    Dietary History  Meals per day: 2  Snacks: occasional,   Typical Snack: chocolate is her go to- craves it at night  Who does the grocery shopping? Daughter or patient or brother or sister in law  Who does the cooking? Daughter or patient or brother or sister in law  A typical meal includes: B: skips or 2 eggs with occasional toast  L: skips or tuna wrap or salad or corn dog  D: meat and veggies occasional starch  Regular Pop: none  Juice: 16 oz per week  Caffeine: coffee 1 1/2 pots per day with cream  Amount of restaurant eating per week: 0-1  Eating a the table with the TV off? yes    Physical Activity Patterns  Current physical activity routine includes: she got on a bike a few days  ago, she plans to swim in the aerated pond on the property that she lives    Limitations from being physically active on a regular basis includes: back pain and leg pain, fatigue, foot pain, cramping        PMH:  Past Medical History:   Diagnosis Date     Bulging disc      DDD (degenerative disc disease)      Diverticular disease      Dysuria      Headache      Hyperlipemia      Insomnia      Kidney stone     20 years ago     LBP (low back pain)      Lumbar canal stenosis      Nicotine dependence      Obesity      Osteoarthritis     knee     PONV (postoperative nausea and vomiting)      Postmenopausal bleeding     D & C       Psoriasis      Umbilical hernia      Vitamin D deficiency        Past Surgical Hx:  Past Surgical History:   Procedure Laterality Date     BREAST BIOPSY Left 30 yrs ago     BREAST CYST EXCISION Left 30 yrs ago    benign tumor     CYSTOSCOPY  9/5/14     [facility]  2013     OOPHORECTOMY Right 35 yrs ago     MA ARTHROPLASTY TIBIAL PLATEAU      Description: Knee Replacement;  Recorded: 09/03/2014;     REPLACEMENT TOTAL KNEE Right        Medication:  Current Outpatient Prescriptions on File Prior to Visit   Medication Sig Dispense Refill     amoxicillin (AMOXIL) 500 MG capsule Take 4 capsules (2,000 mg total) by mouth as needed (prior to dental appt). 4 capsule 4     buPROPion (WELLBUTRIN XL) 150 MG 24 hr tablet Take 1 tablet (150 mg total) by mouth every morning. 180 tablet 2     clobetasol (TEMOVATE) 0.05 % ointment        cyclobenzaprine (FLEXERIL) 10 MG tablet TAKE 1 TABLET(10 MG) BY MOUTH THREE TIMES DAILY AS NEEDED FOR MUSCLE SPASMS 270 tablet 0     diazePAM (VALIUM) 2 MG tablet Take one PO QHS back spasms 20 tablet 0     ibuprofen (ADVIL,MOTRIN) 800 MG tablet TAKE 1 TABLET BY MOUTH THREE TIMES DAILY AS NEEDED FOR PAIN 90 tablet 0     OTEZLA 30 mg Tab 2 (two) times a day.       oxyCODONE-acetaminophen (PERCOCET) 5-325 mg per tablet Take 1-2 tablets by mouth every 6 (six) hours as needed for pain. 90  tablet 0     triamcinolone (KENALOG) 0.1 % cream Apply to affected area on chest TID prn 30 g 1     zolpidem (AMBIEN) 10 mg tablet TAKE 1 TABLET(10 MG) BY MOUTH AT BEDTIME AS NEEDED FOR SLEEP 30 tablet 2     [DISCONTINUED] predniSONE (DELTASONE) 20 MG tablet Take one PO BID x 4 days then one PO Qday x 4 days then 1/2 PO Qday x 4 days 14 tablet 0     No current facility-administered medications on file prior to visit.        Allergies:Other allergy (see comments)    Family Hx:  Family History   Problem Relation Age of Onset     Urolithiasis Mother      single at older age     Heart disease Mother      Hypertension Mother      Lung disease Maternal Grandfather      emphazema     Diabetes Paternal Grandmother      not sure       Social Hx:  Social History     Social History     Marital status: Single     Spouse name: N/A     Number of children: N/A     Years of education: N/A     Occupational History     Not on file.     Social History Main Topics     Smoking status: Current Every Day Smoker     Packs/day: 0.50     Years: 30.00     Smokeless tobacco: Never Used     Alcohol use Yes      Comment: occ.     Drug use: No     Sexual activity: Not on file     Other Topics Concern     Not on file     Social History Narrative       Tobacco Use Hx:  History   Smoking Status     Current Every Day Smoker     Packs/day: 0.50     Years: 30.00   Smokeless Tobacco     Never Used       ROS  General  Fatigue: yes  Sleep Quality:poor  HEENT  Hx of glaucoma: no  Vision changes: yes  Cardiovascular  Chest Pain with Exertion: no  Palpitations: not recently  Hx of heart disease: no  Pulmonary  Shortness of breath at rest: no  Shortness of breath with exertion: yes  Snoring: mild  Snap bang score: 2  [place] Score: 2  Gastrointestinal  Heartburn: yes  Abdominal pain: no  Psychiatric  Moods Stable: yes  Endocrine  Polydipsia: yes  Polyuria: yes  Neurologic:  Hx of seizures: no  Dermatologic  Rashes: psoriasis      /87 (Patient Site:  "Right Arm, Patient Position: Sitting, Cuff Size: Adult Large)  Pulse 97  Ht 5' 3.4\" (1.61 m)  Wt (!) 236 lb 14.4 oz (107.5 kg)  SpO2 97%  Breastfeeding? No  BMI 41.44 kg/m2  Wt Readings from Last 2 Encounters:   04/12/17 (!) 236 lb 14.4 oz (107.5 kg)   02/21/17 (!) 240 lb (108.9 kg)     Body mass index is 41.44 kg/(m^2).  Neck circumference/Waist Circumference: Height: 5' 3.4\" (1.61 m) (4/12/2017  2:27 PM)  Initial Weight: 236.9 lbs (4/12/2017  2:27 PM)  Weight: 236 lb 14.4 oz (107.5 kg) (4/12/2017  2:27 PM)  Weight loss from initial: 0 (4/12/2017  2:27 PM)  % Weight loss: 0 % (4/12/2017  2:27 PM)  BMI (Calculated): 41.5 (4/12/2017  2:27 PM)  SpO2: 97 % (4/12/2017  2:27 PM)  Heart Rate (/min): 97 (4/12/2017  2:27 PM)  BP (mmHg): 128/87 (4/12/2017  2:27 PM)  Waist Circumference (In): 51.5 Inches (4/12/2017  2:27 PM)  Hip Circumference (In): 51.75 Inches (4/12/2017  2:27 PM)  Neck Circumference (In): 16 Inches (4/12/2017  2:27 PM)  NSAIDS: Yes (4/12/2017  2:27 PM)      Physical Exam:    GEN: Alert and oriented in no acute distress.   HEENT: PERRLA, mucous membranes moist. Airway adequate  NECK: Supple without LAD or thyromegaly. Carotid bruits absent.  LUNGS: CTA without wheezes or crackles, good air movement throughout  CV: RRR no MRG  ABDOMEN: moderate protuberance, BS normal, non tender to palpation, no rebound or guarding, no HSM  SKIN: no rashes, pos skin tags, significant acanthosis nigrans  EXTREMITIES: trace edema, dorsalis pedis palpable    Labs:    Lab Results   Component Value Date    WBC 8.8 02/17/2017    HGB 14.2 02/17/2017    HCT 41.9 02/17/2017    MCV 86 02/17/2017     02/17/2017     Lab Results   Component Value Date    CHOL 249 (H) 02/17/2017    CHOL 240 (H) 06/01/2015    CHOL 258 (H) 07/15/2013     Lab Results   Component Value Date    HDL 41 (L) 02/17/2017    HDL 43 06/01/2015    HDL 45 07/15/2013     Lab Results   Component Value Date    LDLCALC 169 (H) 02/17/2017    LDLCALC 172 (H) " 06/01/2015    LDLCALC 171 (H) 07/15/2013     Lab Results   Component Value Date    TRIG 193 (H) 02/17/2017    TRIG 123 06/01/2015    TRIG 210 (H) 07/15/2013     No components found for: CHOLHDL  Lab Results   Component Value Date    ALT 15 02/17/2017    AST 15 02/17/2017    ALKPHOS 73 02/17/2017    BILITOT 0.3 02/17/2017     No results found for: HGBA1C  No results found for: [id number]
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
## 2021-06-12 NOTE — PROGRESS NOTES
[facility] Non Surgical Follow up Visit   Date of visit: 7/26/2017  Physician: Alexia Aggarwal MD  Primary Care is Adam Barnett MD.  Celia Love   57 y.o.  female    Initial Weight: 236 pounds  Today's Weight:   Wt Readings from Last 1 Encounters:   07/26/17 (!) 231 lb (104.8 kg)     Body mass index is 40.28 kg/(m^2).  Weight: 231 lb (104.8 kg)     Assessment and Plan   Assessment: Celia is a 57 y.o. year old female who presents for medical weight management.      Plan:    1. Diabetes  We will try switching her metformin to a 24 hour tab. She will follow up with Dr. Barnett regarding this. She will continue to try to eat a diabetes friendly diet. She will need diabetic education.    2. Morbid obesity, unspecified obesity type  Pt was congratulated on her success thus far. We will try adding Topamax to help with cravings. Possible side effects discussed. She will continue to work on healthy eating and exercise as tolerated.      Follow up in 3 months with bariatrician.         INTERIM HISTORY  We did a screening A1C and found out she had diabetes. She is tolerating metformin without side effects. She is struggling with severe back pain which radiates down either leg (it changes), neck pain and headaches. She is going to occupational therapy for this. She is struggling with eating/craving sweets at night.    DIETARY HISTORY  Meals Per Day: 2-3  Eating Protein First?: yes  Food Diary: B:eggs and toast with peanut butter L:a piece of fruit, greek yogurt D:meat with veggies  Snacks Per Day: nighttime  Typical Snack: sweets  Fluid Intake: 60 oz  Portion Control: good per patient  Calorie Containing Beverages: coffee with cream  Typical Protein Food Choices: meat, greek yogurt  Choosing Whole Grains: yes  Meals at Restaurant per week:rare  Eating at the Table: yes  TV is Off During Meals: yes    Positive Changes Since Last Visit: protein first, drinking water  Struggling With: can't  exercise, sweet cravings    Knowledgeable in Reading Food Labels: yes  Getting Adequate Protein: working on it  Sleeping 7-8 hours/day   Stress management na    PHYSICAL ACTIVITY PATTERNS:  Cardiovascular: was biking but unable due to pain currently  Strength Training: none    REVIEW OF SYSTEMS  GENERAL/CONSTITUTIONAL:  Fatigue: yes, doesn't sleep well due to pain  HEENT:  Vision changes, glaucoma: no  CARDIOVASCULAR:  Chest Pain with Exertion: no  PULMONARY:  Dyspnea on exertion: yes  NEUROLOGIC:  Paresthesias: yes  PSYCHIATRIC:  Moods: stable  MUSCULOSKELETAL/RHEUMATOLOGIC  Arthralgias: back pain, neck pain  Myalgias: no  ENDOCRINE:  Monitoring Blood Sugars: no  Sugars Well Controlled: na       Patient Profile   Social History     Social History Narrative        Past Medical History   Past Medical History:   Diagnosis Date     Bulging disc      DDD (degenerative disc disease)      Diverticular disease      Dysuria      Headache      Hyperlipemia      Insomnia      Kidney stone     20 years ago     LBP (low back pain)      Lumbar canal stenosis      Nicotine dependence      Obesity      Osteoarthritis     knee     PONV (postoperative nausea and vomiting)      Postmenopausal bleeding     D & C       Psoriasis      Umbilical hernia      Vitamin D deficiency      Patient Active Problem List   Diagnosis     Morbid obesity     Insomnia     Generalized Pain     Headache     Psoriasis     Hyperlipidemia     Nicotine Dependence     Osteoarthritis Of The Knee     Bulging Disc (L5 - S1)     Vitamin D Deficiency     Calculus of kidney     Controlled substance agreement signed     Low back pain     Lumbar stenosis with neurogenic claudication     Depression     Type 2 diabetes mellitus     Current Outpatient Prescriptions   Medication Sig Note     buPROPion (WELLBUTRIN XL) 150 MG 24 hr tablet Take 1 tablet (150 mg total) by mouth every morning.      cholecalciferol, vitamin D3, (VITAMIN D3) 2,000 unit cap Take 1 capsule by  "mouth daily.      clobetasol (TEMOVATE) 0.05 % ointment  5/1/2015: Received from: External Pharmacy     clobetasol (TEMOVATE) 0.05 % ointment Apply topically. 4/12/2017: Received from: [facility] Received Sig: Apply topically 2 times daily     cyclobenzaprine (FLEXERIL) 10 MG tablet TAKE 1 TABLET(10 MG) BY MOUTH THREE TIMES DAILY AS NEEDED FOR MUSCLE SPASMS      diazePAM (VALIUM) 2 MG tablet TAKE 1 TABLET BY MOUTH EVERY NIGHT AT BEDTIME FOR BACK SPASMS      ibuprofen (ADVIL,MOTRIN) 800 MG tablet TAKE 1 TABLET BY MOUTH THREE TIMES DAILY AS NEEDED FOR PAIN      metFORMIN (GLUCOPHAGE) 500 MG tablet Take 1 tablet (500 mg total) by mouth 2 (two) times a day with meals.      OTEZLA 30 mg Tab 2 (two) times a day. 12/17/2015: Received from: External Pharmacy Received Sig:      oxyCODONE-acetaminophen (PERCOCET) 5-325 mg per tablet Take 1-2 tablets by mouth every 6 (six) hours as needed for pain.      pantoprazole (PROTONIX) 40 MG tablet Take 1  PO Qday      triamcinolone (KENALOG) 0.1 % cream Apply to affected area on chest TID prn      zolpidem (AMBIEN) 10 mg tablet TAKE ONE TABLET BY MOUTH AT BEDTIME AS NEEDED FOR SLEEP      amoxicillin (AMOXIL) 500 MG capsule Take 4 capsules (2,000 mg total) by mouth as needed (prior to dental appt).        Past Surgical History  She has a past surgical history that includes Breast biopsy (Left, 30 yrs ago); Breast cyst excision (Left, 30 yrs ago); Oophorectomy (Right, 35 yrs ago); DNC (2013); Replacement total knee (Right); Cystoscopy (9/5/14); and arthroplasty tibial plateau.     Examination   /81  Pulse 89  Temp 97.6  F (36.4  C)  Ht 5' 3.5\" (1.613 m)  Wt (!) 231 lb (104.8 kg)  BMI 40.28 kg/m2  Height: 5' 3.5\" (1.613 m) (7/26/2017 10:09 AM)  Initial Weight: 236.9 lbs (4/28/2017 11:00 AM)  Weight: 231 lb (104.8 kg) (7/26/2017 10:09 AM)  Weight loss from initial: 11.1 (4/28/2017 11:00 AM)  % Weight loss: 4.69 % (4/28/2017 11:00 AM)  BMI (Calculated): 40.3 (7/26/2017 10:09 " AM)  SpO2: 97 % (4/12/2017  2:27 PM)  Heart Rate (/min): 97 (4/12/2017  2:27 PM)  BP (mmHg): 128/87 (4/12/2017  2:27 PM)  Waist Circumference (In): 51.5 Inches (4/12/2017  2:27 PM)  Hip Circumference (In): 51.75 Inches (4/12/2017  2:27 PM)  Neck Circumference (In): 16 Inches (4/12/2017  2:27 PM)  NSAIDS: Yes (4/12/2017  2:27 PM)  General:  Alert and ambulatory, NAD  HEENT:  No conjunctival pallor, moist mucous Membranes, neck is without LAD  Pulmonary:  Normal respiratory effort, no cough, no audible wheezes/crackles.  CV:  Regular rate and Rhythm, no murmurs  Abdominal: BS normal,soft, NT without rebound or guarding  Pscyh/Mood: stable         Counseling:   We reviewed the important post op bariatric recommendations:  -eating 3 meals daily  -eating protein first, getting >60gm protein daily  -eating slowly, chewing food well  -avoiding/limiting calorie containing beverages  -limiting starchy vegetables and carbohydrates, choosing wheat, not white with breads,   crackers, pastas, eddie, bagels, tortillas, rice  -limiting restaurant or cafeteria eating to twice a week or less    We discussed the importance of restorative sleep and stress management in maintaining a healthy weight.  We discussed the National Weight Control Registry healthy weight maintenance strategies and ways to optimize metabolism.  We discussed the importance of physical activity including cardiovascular and strength training in maintaining a healthier weight.    > 25 min spent with patient, > 50% spent in counseling         TEJ Aggarwal MD  [facility].
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
## 2021-06-13 NOTE — PROGRESS NOTES
Assessment/ Plan     1. Coronary artery disease    Reviewed recent hospitalization in [place] for a non-ST elevation myocardial infarction  She is status post coronary stent in the mid circumflex with a bare metal stent    Recommend that she follow advice of cardiology  She will continue clopidogrel 75 mg daily and understands that she will not discontinue that until cardiology says she can  Continue atorvastatin 40 mg p.o. nightly  Continue aspirin 81 mg daily  Continue Toprol-XL 25 mg daily  She may use nitroglycerin as needed  Recommend cardiac rehabilitation  Strongly recommend tobacco cessation    2. Type 2 diabetes mellitus    Continue metformin 70 mg p.o. Daily  Recommend improving diet and exercise  - Microalbumin, Random Urine  Stressed importance of tobacco cessation  Check urinary microalbumin  Favor an ACE inhibitor  Recommend an annual eye examination  Discussed foot care    3. Hyperlipidemia    Continue atorvastatin  Follow-up to recheck lipid cascade and hepatic profile in 8 weeks    4. Nicotine Dependence    I have counseled the patient for tobacco cessation and the follow up will occur  at the next visit.    5. Osteoarthritis of left knee    Recommend follow-up with orthopedics  Given her recent myocardial infarction surgery will need to be delayed    6. Chronic bilateral low back pain with right-sided sciatica    Reviewed her history of chronic low back pain and significant knee osteoarthritis  Patient signed a controlled substance agreement  Recommend using Percocet sparingly  Discussed potential risks including diazepam which he has been taking  Reviewed the [place] prescription monitoring service  Review potential risks including but not limited to habit formation, respiratory sedation, even increased risk of death    Patient agrees to plan    40 minutes were spent with the patient and greater than 50% of the time was spent in face to face counseling and coordination of  care        Subjective:       Celia Love is a 57 y.o. female who presents to the clinic in follow-up after a hospitalization in [place] for a myocardial infarction.  Essentially, she experienced chest pain with radiation to her jaw.  She had associated headaches and as result presented emergency department where she was assessed to have a non-ST elevation myocardial infarction.  She underwent cardiac catheterization and had a bare-metal stent placed in the mid circumflex.  She was discharged on clopidogrel, atorvastatin, metoprolol, and aspirin.  She has followed up for cardiac rehabilitation.  She has plans to follow-up with audiology.  As noted she lives in [place] and has a complex medical history.  She continues to use tobacco though she understands she should quit.  She has chronic knee and low back pain as well.  She has had a previous knee arthroplasty of her right knee but has been having chronic left knee pain is a candidate for left knee arthroplasty.    She has known lumbar canal stenosis and a disc bulge at L5 - S1. A recent MRI showed a stable moderate size diffuse posterior disc bulge at L5 - S1. This results in moderate to severe spinal canal stenosis. This partially effaces the descending bilateral S1 nerve roots. There is moderate left and moderate to severe right neural foraminal stenosis as well. There is mild spinal canal stenosis at L3-L4 and L4-L5. Her pain continues to be debilitating. After her last visit she was referred to spine care. She received previous cortisone injections with minimal relief. She also followed up with neurosurgery. She was assessed to be  a candidate for a decompressive L1-S1 bilateral hemilaminectomy, medial facetectomy, and diskectomy.  She has been working on nutrition and trying to lose weight.  She will take Percocet on occasion for breakthrough pain.  She has taken diazepam for back spasms as well.    Notes from consultation have been reviewed.  She  has current chest pain occasionally can be fatigued.  She has been taking metformin given history of type 2 diabetes mellitus.  She denies fever, chills, bowel changes, or urinary concerns.    The following portions of the patient's history were reviewed and updated as appropriate: allergies, current medications, past family history, past medical history, past social history, past surgical history and problem list.    Review of Systems   A 12 point comprehensive review of systems was negative except as noted.      Current Outpatient Prescriptions   Medication Sig Dispense Refill     amoxicillin (AMOXIL) 500 MG capsule Take 4 capsules (2,000 mg total) by mouth as needed (prior to dental appt). 4 capsule 4     aspirin 81 MG EC tablet Take 81 mg by mouth daily.       atorvastatin (LIPITOR) 40 MG tablet Take 40 mg by mouth at bedtime.       buPROPion (WELLBUTRIN XL) 150 MG 24 hr tablet Take 1 tablet (150 mg total) by mouth every morning. 180 tablet 2     cholecalciferol, vitamin D3, (VITAMIN D3) 2,000 unit cap Take 1 capsule by mouth daily. 90 each prn     clobetasol (TEMOVATE) 0.05 % ointment        clobetasol (TEMOVATE) 0.05 % ointment Apply topically.       clopidogrel (PLAVIX) 75 mg tablet Take 75 mg by mouth daily.       cyclobenzaprine (FLEXERIL) 10 MG tablet TAKE 1 TABLET(10 MG) BY MOUTH THREE TIMES DAILY AS NEEDED FOR MUSCLE SPASMS 270 tablet 0     diazePAM (VALIUM) 2 MG tablet TAKE 1 TABLET BY MOUTH EVERY NIGHT AT BEDTIME FOR BACK SPASMS 20 tablet 0     metFORMIN (GLUCOPHAGE-XR) 750 MG 24 hr tablet 1 tab with dinner every day 90 tablet 1     metoprolol succinate (TOPROL-XL) 25 MG Take 25 mg by mouth daily.       OTEZLA 30 mg Tab 2 (two) times a day.       oxyCODONE-acetaminophen (PERCOCET) 5-325 mg per tablet Take 1-2 tablets by mouth every 6 (six) hours as needed for pain. 90 tablet 0     pantoprazole (PROTONIX) 40 MG tablet Take 1  PO Qday 90 tablet 1     triamcinolone (KENALOG) 0.1 % cream Apply to affected  "area on chest TID prn 30 g 1     zolpidem (AMBIEN) 10 mg tablet TAKE 1 TABLET BY MOUTH AT BEDTIME AS NEEDED FOR SLEEP 30 tablet 0     No current facility-administered medications for this visit.        Objective:      /62  Pulse 78  Temp 98  F (36.7  C) (Oral)   Resp 16  Ht 5' 3.5\" (1.613 m)  SpO2 99%      General appearance: alert, appears stated age and cooperative  Head: Normocephalic, without obvious abnormality, atraumatic  Eyes: conjunctivae/corneas clear. PERRL, EOM's intact. .  Ears: normal TM's and external ear canals both ears  Nose: Nares normal. Septum midline. Mucosa normal. No drainage or sinus tenderness.  Throat: lips, mucosa, and tongue normal; teeth and gums normal  Neck: no adenopathy, no carotid bruit, no JVD, supple, symmetrical  Back: symmetric, no curvature. ROM normal. No CVA tenderness.  She has paraspinous muscle tenderness lateral to lumbar spine  Straight leg raise is equivocal bilaterally  Lungs: clear to auscultation bilaterally  Heart: regular rate and rhythm, S1, S2 normal, no murmur, click, rub or gallop  Abdomen: soft, non-tender; bowel sounds normal; no masses,  no organomegaly  Extremities: extremities normal, atraumatic, no cyanosis or edema  Lymph nodes: Cervical, supraclavicular, and axillary nodes normal.  Neurologic: Alert and oriented X 3. Normal coordination and gait         Recent Results (from the past 168 hour(s))   Microalbumin, Random Urine   Result Value Ref Range    Microalbumin, Random Urine <0.50 0.00 - 1.99 mg/dL    Creatinine, Urine 61.4 mg/dL    Microalbumin/Creatinine Ratio Random Urine  <=19.9 mg/g          This note has been dictated using voice recognition software. Any grammatical or context distortions are unintentional and inherent to the software  "

## 2021-06-15 PROBLEM — E11.9 TYPE 2 DIABETES MELLITUS (H): Status: ACTIVE | Noted: 2017-04-19

## 2021-06-15 PROBLEM — F32.A DEPRESSION: Status: ACTIVE | Noted: 2017-02-15

## 2021-06-15 PROBLEM — Z79.899 CONTROLLED SUBSTANCE AGREEMENT SIGNED: Status: ACTIVE | Noted: 2017-07-25

## 2021-06-16 PROBLEM — I25.10 CORONARY ARTERY DISEASE: Status: ACTIVE | Noted: 2017-10-09

## 2021-07-22 ENCOUNTER — RECORDS - HEALTHEAST (OUTPATIENT)
Dept: FAMILY MEDICINE | Facility: CLINIC | Age: 61
End: 2021-07-22

## 2021-07-22 DIAGNOSIS — Z12.31 OTHER SCREENING MAMMOGRAM: ICD-10-CM

## 2021-07-25 ENCOUNTER — HEALTH MAINTENANCE LETTER (OUTPATIENT)
Age: 61
End: 2021-07-25

## 2021-09-19 ENCOUNTER — HEALTH MAINTENANCE LETTER (OUTPATIENT)
Age: 61
End: 2021-09-19

## 2022-03-06 ENCOUNTER — HEALTH MAINTENANCE LETTER (OUTPATIENT)
Age: 62
End: 2022-03-06

## 2022-08-21 ENCOUNTER — HEALTH MAINTENANCE LETTER (OUTPATIENT)
Age: 62
End: 2022-08-21

## 2022-11-21 ENCOUNTER — HEALTH MAINTENANCE LETTER (OUTPATIENT)
Age: 62
End: 2022-11-21

## 2023-06-02 ENCOUNTER — HEALTH MAINTENANCE LETTER (OUTPATIENT)
Age: 63
End: 2023-06-02

## 2023-09-16 ENCOUNTER — HEALTH MAINTENANCE LETTER (OUTPATIENT)
Age: 63
End: 2023-09-16

## 2024-02-03 ENCOUNTER — HEALTH MAINTENANCE LETTER (OUTPATIENT)
Age: 64
End: 2024-02-03